# Patient Record
Sex: MALE | Race: WHITE | Employment: FULL TIME | URBAN - METROPOLITAN AREA
[De-identification: names, ages, dates, MRNs, and addresses within clinical notes are randomized per-mention and may not be internally consistent; named-entity substitution may affect disease eponyms.]

---

## 2018-08-06 ENCOUNTER — OFFICE VISIT (OUTPATIENT)
Dept: ORTHOPEDIC SURGERY | Age: 63
End: 2018-08-06

## 2018-08-06 ENCOUNTER — TELEPHONE (OUTPATIENT)
Dept: ORTHOPEDIC SURGERY | Age: 63
End: 2018-08-06

## 2018-08-06 VITALS — WEIGHT: 255 LBS | HEIGHT: 73 IN | BODY MASS INDEX: 33.8 KG/M2

## 2018-08-06 DIAGNOSIS — R52 PAIN: Primary | ICD-10-CM

## 2018-08-06 DIAGNOSIS — M17.11 PRIMARY OSTEOARTHRITIS OF RIGHT KNEE: ICD-10-CM

## 2018-08-06 DIAGNOSIS — M23.221 DERANGEMENT OF POSTERIOR HORN OF MEDIAL MENISCUS DUE TO OLD TEAR OR INJURY, RIGHT KNEE: ICD-10-CM

## 2018-08-06 DIAGNOSIS — M25.561 RIGHT KNEE PAIN, UNSPECIFIED CHRONICITY: ICD-10-CM

## 2018-08-06 PROCEDURE — 20610 DRAIN/INJ JOINT/BURSA W/O US: CPT | Performed by: ORTHOPAEDIC SURGERY

## 2018-08-06 PROCEDURE — 99203 OFFICE O/P NEW LOW 30 MIN: CPT | Performed by: ORTHOPAEDIC SURGERY

## 2018-08-06 RX ORDER — ACYCLOVIR 400 MG/1
400 TABLET ORAL DAILY
COMMUNITY

## 2018-08-06 RX ORDER — MELOXICAM 7.5 MG/1
15 TABLET ORAL DAILY
Qty: 30 TABLET | Refills: 3 | Status: SHIPPED | OUTPATIENT
Start: 2018-08-06 | End: 2018-08-22

## 2018-08-06 RX ORDER — METOPROLOL SUCCINATE 50 MG/1
50 TABLET, EXTENDED RELEASE ORAL DAILY
COMMUNITY

## 2018-08-06 NOTE — PROGRESS NOTES
Chief Complaint    Knee Pain (R KNEE PAIN - PT SLIPPED DOWN STAIRS 3 YEARS AGO - LAST 6 WKS INTOLERABLE )      History of Present Illness:  Yaz Reyez is a 70 y.o. male. 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 This is been going on for about 6 weeks. He describes pain as 8/10. He describes primarily pain, swelling and weakness. Symptoms are fairly constant. He sometimes has the problem lying down and trying to sleep and also alleviates this by sitting upright. Cyst on the meniscus removed in 1983. He also has symptoms increasing with bent knee activities such as squatting, kneeling and lunging. Steps and hills to bother his knee. The pain is anterior and medial.      Pain Assessment  Location of Pain: Knee  Location Modifiers: Right  Severity of Pain: 7  Quality of Pain: Throbbing, Sharp, Dull, Aching, Locking, Grinding, Popping, Cracking, Buckling  Duration of Pain: Persistent  Frequency of Pain: Constant  Date Pain First Started: 06/06/18  Aggravating Factors: Other (Comment) (LAYING DOWN )  Limiting Behavior: Some  Relieving Factors: Rest, Nsaids, Ice  Result of Injury: No  Work-Related Injury: No  Are there other pain locations you wish to document?: No    Medical History:  Patient's medications, allergies, past medical, surgical, social and family histories were reviewed and updated as appropriate. Review of Systems:  Pertinent items are noted in HPI  Review of systems reviewed from Patient History Form dated on August 6th 2018 and available in the patient's chart under the Media tab. Vital Signs:  Ht 6' 1\" (1.854 m)   Wt 255 lb (115.7 kg)   BMI 33.64 kg/m²     General Exam:   Constitutional: Patient is adequately groomed with no evidence of malnutrition  Mental Status: The patient is oriented to time, place and person. The patient's mood and affect are appropriate.   Lymphatic: The lymphatic examination bilaterally reveals all areas to be without enlargement or induration. Knee Examination:    Inspection:  Trace effusion right knee    Palpation:  Grade 3-4 patellofemoral crepitus with severe medial and lateral retropatellar and peripatellar tenderness. He has mild lateral tenderness. He has mild medial tenderness. Mei sign did not produce catch or click. Ligamentous stability good in all directions. Range of Motion:  0-130° right knee    Strength:  Quadriceps strength reduced to 3/5 right knee    Special Tests:  Negative Homans sign right    Skin: There are no rashes, ulcerations or lesions. Gait: Mild antalgic gait right    Reflex intact    Additional Comments:       Additional Examinations:         Contralateral Exam: Examination of the contralateral knee reveals warm skin, range of motion within normal limits, good quadriceps bulk, tone and strength, no tenderness to palpation, stable cruciate and collateral ligaments, and no joint line tenderness. Right Lower Extremity: Examination of the right lower extremity does not show any tenderness, deformity or injury. Range of motion is unremarkable. There is no gross instability. There are no rashes, ulcerations or lesions. Strength and tone are normal.    Radiology:     X-rays obtained and reviewed in office:  Views standing AP, PA, lateral and sunrise  Location right knee  Impression medial and lateral compartments are fairly well maintained was pain but the patellofemoral compartment shows advanced close to bone-on-bone primary osteoarthritis         Assessment :  MRI scan that his been done showed signal change at the medial meniscus root concern for undersurface tearing without complete discontinuity of the medial meniscus extrusion. No evidence of lateral meniscus tear. Intact ligaments.   Osteoarthritic change most pronounced in the patellofemoral joint with widespread chondral denudement    Impression:  Encounter Diagnoses   Name

## 2018-08-08 ENCOUNTER — HOSPITAL ENCOUNTER (OUTPATIENT)
Dept: PHYSICAL THERAPY | Age: 63
Setting detail: THERAPIES SERIES
Discharge: HOME | End: 2018-08-08
Payer: COMMERCIAL

## 2018-08-08 PROCEDURE — 97140 MANUAL THERAPY 1/> REGIONS: CPT

## 2018-08-08 PROCEDURE — 97110 THERAPEUTIC EXERCISES: CPT

## 2018-08-08 PROCEDURE — 97161 PT EVAL LOW COMPLEX 20 MIN: CPT

## 2018-08-08 PROCEDURE — G8979 MOBILITY GOAL STATUS: HCPCS

## 2018-08-08 PROCEDURE — G8978 MOBILITY CURRENT STATUS: HCPCS

## 2018-08-08 NOTE — PLAN OF CARE
first semi-comfortable day in awhile. Relevant Medical History:R knee cyst removal 20 years ago  Functional Disability Index:PT G-Codes  Functional Assessment Tool Used: LEFS  Score: 63%  Functional Limitation: Mobility: Walking and moving around  Mobility: Walking and Moving Around Current Status (): At least 60 percent but less than 80 percent impaired, limited or restricted  Mobility: Walking and Moving Around Goal Status (): At least 20 percent but less than 40 percent impaired, limited or restricted    Pain Scale: 7 at worst/10  Easing factors: ice, cortisone injection on Monday  Provocative factors: prolonged WB     Type: []Constant   []Intermittent  []Radiating []Localized []other:     Numbness/Tingling: none    Occupation/School: and EMT    Living Status/Prior Level of Function: Independent with ADLs and IADLs, physical job, walking    OBJECTIVE:     ROM LEFT RIGHT   HIP Flex     HIP Abd     HIP Ext     HIP IR     HIP ER     Knee ext 0 0   Knee Flex 135 110   Ankle PF     Ankle DF     Ankle In     Ankle Ev     Strength  LEFT RIGHT   HIP Flexors     HIP Abductors 4- 3+   HIP Ext     Hip ER     Knee EXT (quad) 4+ 4- painful   Knee Flex (HS) 4+ 4   Ankle DF     Ankle PF     Ankle Inv     Ankle EV          Circumference  Mid apex  7 cm prox             Reflexes/Sensation:    []Dermatomes/Myotomes intact    [x]Reflexes equal and normal bilaterally   []Other:    Joint mobility:    []Normal    [x]Hypo   []Hyper    Palpation: TTP over medial aspect of knee, increased tissue tension in ITB and HS    Functional Mobility/Transfers: independent    Posture: decreased WB on R LE, hip drop bilaterally in single leg stance    Bandages/Dressings/Incisions: none    Gait: (include devices/WB status) decreased knee flexion and ext on R, decreased stance time R    Orthopedic Special Tests: none                       [x] Patient history, allergies, meds reviewed. Medical chart reviewed.  See intake

## 2018-08-08 NOTE — FLOWSHEET NOTE
RESTRICTIONS/PRECAUTIONS: PPP protocol 0-40 degrees for knee exercises     Exercises/Interventions:     Therapeutic Ex Sets/reps Notes   Long sit HS stretch 3 x 30\" HEP   Gastroc strap stretch 3 x 30\" HEP   sidelying ITB stretch 3 x 30\" HEP   Quad set with ADD squeeze  x 10 HEP   SLR 2 x 10 HEP   SAQ 2 x 10 HEP   Clamshell 2 x 10 HEP                                                Manual Intervention     PROM 4 min    Patellar mobs 2 min    HS stretch 2 min                   NMR re-education                                                      Therapeutic Exercise and NMR EXR  [x] (90098) Provided verbal/tactile cueing for activities related to strengthening, flexibility, endurance, ROM for improvements in LE, proximal hip, and core control with self care, mobility, lifting, ambulation.  [] (74728) Provided verbal/tactile cueing for activities related to improving balance, coordination, kinesthetic sense, posture, motor skill, proprioception  to assist with LE, proximal hip, and core control in self care, mobility, lifting, ambulation and eccentric single leg control.      NMR and Therapeutic Activities:    [x] (68177 or 75496) Provided verbal/tactile cueing for activities related to improving balance, coordination, kinesthetic sense, posture, motor skill, proprioception and motor activation to allow for proper function of core, proximal hip and LE with self care and ADLs  [] (16055) Gait Re-education- Provided training and instruction to the patient for proper LE, core and proximal hip recruitment and positioning and eccentric body weight control with ambulation re-education including up and down stairs     Home Exercise Program:    [x] (02768) Reviewed/Progressed HEP activities related to strengthening, flexibility, endurance, ROM of core, proximal hip and LE for functional self-care, mobility, lifting and ambulation/stair navigation   [] (34681)Reviewed/Progressed HEP activities related to improving for proper functional mobility as indicated by patients Functional Deficits. 4. Patient will return to walking for 1 mile without increased symptoms or restriction. 5. Patient will be able to ascend/descend stairs with reciprocal pattern for return to normal ADLs. New or Updated Goals (if applicable):  [x] No change to goals established upon initial eval/last progress note:  New Goals:    Progression Towards Functional goals:   [] Patient is progressing as expected towards functional goals listed. [] Progression is slowed due to complexities listed. [] Progression has been slowed due to co-morbidities.   [x] Plan just implemented, too soon to assess goals progression  [] Other:     ASSESSMENT:    [] Improvement noted relative to goals:  [] No Improvement noted related to goals:  Summary/Patient's response to treatment: See Eval    Treatment/Activity Tolerance:  [x] Patient tolerated treatment well [] Patient limited by fatique  [] Patient limited by pain  [] Patient limited by other medical complications  [] Other:     Prognosis: [x] Good [] Fair  [] Poor    Patient Requires Follow-up: [x] Yes  [] No    PLAN: See eval  [] Continue per plan of care [] Alter current plan (see comments)  [x] Plan of care initiated [] Hold pending MD visit [] Discharge    Electronically signed by: Melba Rowe PT

## 2018-08-13 ENCOUNTER — HOSPITAL ENCOUNTER (OUTPATIENT)
Dept: PHYSICAL THERAPY | Age: 63
Setting detail: THERAPIES SERIES
Discharge: HOME | End: 2018-08-13
Payer: COMMERCIAL

## 2018-08-13 ENCOUNTER — OFFICE VISIT (OUTPATIENT)
Dept: ORTHOPEDIC SURGERY | Age: 63
End: 2018-08-13

## 2018-08-13 DIAGNOSIS — M17.11 PRIMARY OSTEOARTHRITIS OF RIGHT KNEE: Primary | Chronic | ICD-10-CM

## 2018-08-13 PROCEDURE — 99212 OFFICE O/P EST SF 10 MIN: CPT | Performed by: ORTHOPAEDIC SURGERY

## 2018-08-13 NOTE — PROGRESS NOTES
His MRI scan of the right knee reveals lateral pressure syndrome. Grade 4 arthritis and lateral facet of the patella and lateral trochlea with full-thickness chondral loss and osteochondral erosion. He has grade 3 arthritis of the medial femorotibial compartment with chondral thinning, fissuring and a subtle stress osseous edema. He does have a 1.5 cm flap tear involving half of the posterior horn and root of the medial meniscus. Focal grade 3 arthritis of the lateral femoral condyle. Review of systems from August 6, 2018 unchanged. He continues with severe medial pain and this wakes him up routinely at nighttime. We made it clear to them that the lateral side is mostly arthritis and he does have pretty advanced arthritis in the patellofemoral joint but also moderate arthritis in the other 2 compartments. Because of his continued sharp catching sensations we have recommended going ahead with arthroscopy for partial medial meniscectomy and chondroplasty or debridement as needed. We discussed in great detail the risks and benefits. We discussed the risks, benefits, and complications of arthroscopic knee surgery. The patient realizes that there are concerns with this surgery with respect to infection, deep vein thrombosis, neurological injury, delayed  rehabilitation, the possibility of arthrofibrosis of the knee, and specifically  Hoffa's fat pad fibrosis that can potentially cause difficulties. The patient realizes that there are also anesthetic concerns including cardiopulmonary issues, pulmonary issues, and even possibility of death or dystrophy. The patient voiced understanding to this as well as the normal  rehabilitation  that   is involved with weeks of physical therapy, exercise, and strengthening.  The patient also realizes that even though the surgery, from a functional perspective, typically allows the patient to return to good function at about 6 weeks, that it often takes 6 months to completely rehabilitate from this operation. The patient also realizes that if there is an arthritic component to the symptoms, then they may still have some degree of arthritis pain.       I spent 10+ minutes with the patient including 5+ minutes face to face with the patient discussing and answering questions regarding their medial meniscus tear which is unstable along with primary osteoarthritis

## 2018-08-14 ENCOUNTER — TELEPHONE (OUTPATIENT)
Dept: ORTHOPEDIC SURGERY | Age: 63
End: 2018-08-14

## 2018-08-15 ENCOUNTER — APPOINTMENT (OUTPATIENT)
Dept: PHYSICAL THERAPY | Age: 63
End: 2018-08-15
Payer: COMMERCIAL

## 2018-08-16 ENCOUNTER — TELEPHONE (OUTPATIENT)
Dept: ORTHOPEDIC SURGERY | Age: 63
End: 2018-08-16

## 2018-08-20 NOTE — PROGRESS NOTES
Unsuccessful attempt to contact pt for PAT call on number provided- LMA with return number- awaiting return call

## 2018-08-21 ENCOUNTER — TELEPHONE (OUTPATIENT)
Dept: ORTHOPEDIC SURGERY | Age: 63
End: 2018-08-21

## 2018-08-22 ENCOUNTER — APPOINTMENT (OUTPATIENT)
Dept: PHYSICAL THERAPY | Age: 63
End: 2018-08-22
Payer: COMMERCIAL

## 2018-08-23 ENCOUNTER — ANESTHESIA EVENT (OUTPATIENT)
Dept: OPERATING ROOM | Age: 63
End: 2018-08-23
Payer: COMMERCIAL

## 2018-08-24 ENCOUNTER — ANESTHESIA (OUTPATIENT)
Dept: OPERATING ROOM | Age: 63
End: 2018-08-24
Payer: COMMERCIAL

## 2018-08-24 ENCOUNTER — HOSPITAL ENCOUNTER (OUTPATIENT)
Age: 63
Setting detail: OUTPATIENT SURGERY
Discharge: HOME | End: 2018-08-24
Attending: ORTHOPAEDIC SURGERY | Admitting: ORTHOPAEDIC SURGERY
Payer: COMMERCIAL

## 2018-08-24 VITALS
TEMPERATURE: 96.8 F | OXYGEN SATURATION: 96 % | SYSTOLIC BLOOD PRESSURE: 106 MMHG | DIASTOLIC BLOOD PRESSURE: 71 MMHG | RESPIRATION RATE: 1 BRPM

## 2018-08-24 VITALS
HEART RATE: 80 BPM | HEIGHT: 73 IN | BODY MASS INDEX: 34.46 KG/M2 | RESPIRATION RATE: 15 BRPM | DIASTOLIC BLOOD PRESSURE: 78 MMHG | TEMPERATURE: 96.8 F | WEIGHT: 260 LBS | SYSTOLIC BLOOD PRESSURE: 137 MMHG | OXYGEN SATURATION: 98 %

## 2018-08-24 DIAGNOSIS — M23.261 DERANGEMENT OF OTHER LATERAL MENISCUS DUE TO OLD TEAR OR INJURY, RIGHT KNEE: Primary | Chronic | ICD-10-CM

## 2018-08-24 DIAGNOSIS — M23.221 DERANGEMENT OF POSTERIOR HORN OF MEDIAL MENISCUS DUE TO OLD TEAR OR INJURY, RIGHT KNEE: Chronic | ICD-10-CM

## 2018-08-24 DIAGNOSIS — M17.11 PRIMARY OSTEOARTHRITIS OF RIGHT KNEE: Chronic | ICD-10-CM

## 2018-08-24 LAB
GLUCOSE BLD-MCNC: 130 MG/DL (ref 70–99)
GLUCOSE BLD-MCNC: 142 MG/DL (ref 70–99)
PERFORMED ON: ABNORMAL
PERFORMED ON: ABNORMAL

## 2018-08-24 PROCEDURE — 6360000002 HC RX W HCPCS: Performed by: ORTHOPAEDIC SURGERY

## 2018-08-24 PROCEDURE — 6360000002 HC RX W HCPCS: Performed by: NURSE ANESTHETIST, CERTIFIED REGISTERED

## 2018-08-24 PROCEDURE — 2580000003 HC RX 258: Performed by: ORTHOPAEDIC SURGERY

## 2018-08-24 PROCEDURE — 6370000000 HC RX 637 (ALT 250 FOR IP)

## 2018-08-24 PROCEDURE — 6360000002 HC RX W HCPCS

## 2018-08-24 PROCEDURE — 7100000001 HC PACU RECOVERY - ADDTL 15 MIN: Performed by: ORTHOPAEDIC SURGERY

## 2018-08-24 PROCEDURE — 7100000010 HC PHASE II RECOVERY - FIRST 15 MIN: Performed by: ORTHOPAEDIC SURGERY

## 2018-08-24 PROCEDURE — 3600000014 HC SURGERY LEVEL 4 ADDTL 15MIN: Performed by: ORTHOPAEDIC SURGERY

## 2018-08-24 PROCEDURE — 3700000000 HC ANESTHESIA ATTENDED CARE: Performed by: ORTHOPAEDIC SURGERY

## 2018-08-24 PROCEDURE — 2580000003 HC RX 258: Performed by: ANESTHESIOLOGY

## 2018-08-24 PROCEDURE — 7100000011 HC PHASE II RECOVERY - ADDTL 15 MIN: Performed by: ORTHOPAEDIC SURGERY

## 2018-08-24 PROCEDURE — 7100000000 HC PACU RECOVERY - FIRST 15 MIN: Performed by: ORTHOPAEDIC SURGERY

## 2018-08-24 PROCEDURE — 3600000004 HC SURGERY LEVEL 4 BASE: Performed by: ORTHOPAEDIC SURGERY

## 2018-08-24 PROCEDURE — 2500000003 HC RX 250 WO HCPCS: Performed by: ORTHOPAEDIC SURGERY

## 2018-08-24 PROCEDURE — 3700000001 HC ADD 15 MINUTES (ANESTHESIA): Performed by: ORTHOPAEDIC SURGERY

## 2018-08-24 PROCEDURE — 2709999900 HC NON-CHARGEABLE SUPPLY: Performed by: ORTHOPAEDIC SURGERY

## 2018-08-24 PROCEDURE — 2500000003 HC RX 250 WO HCPCS: Performed by: NURSE ANESTHETIST, CERTIFIED REGISTERED

## 2018-08-24 RX ORDER — MORPHINE SULFATE 2 MG/ML
2 INJECTION, SOLUTION INTRAMUSCULAR; INTRAVENOUS EVERY 5 MIN PRN
Status: DISCONTINUED | OUTPATIENT
Start: 2018-08-24 | End: 2018-08-24 | Stop reason: HOSPADM

## 2018-08-24 RX ORDER — BUPIVACAINE HYDROCHLORIDE AND EPINEPHRINE 2.5; 5 MG/ML; UG/ML
INJECTION, SOLUTION EPIDURAL; INFILTRATION; INTRACAUDAL; PERINEURAL PRN
Status: DISCONTINUED | OUTPATIENT
Start: 2018-08-24 | End: 2018-08-24 | Stop reason: HOSPADM

## 2018-08-24 RX ORDER — APREPITANT 40 MG/1
40 CAPSULE ORAL ONCE
Status: COMPLETED | OUTPATIENT
Start: 2018-08-24 | End: 2018-08-24

## 2018-08-24 RX ORDER — MIDAZOLAM HYDROCHLORIDE 1 MG/ML
INJECTION INTRAMUSCULAR; INTRAVENOUS PRN
Status: DISCONTINUED | OUTPATIENT
Start: 2018-08-24 | End: 2018-08-24 | Stop reason: SDUPTHER

## 2018-08-24 RX ORDER — HYDRALAZINE HYDROCHLORIDE 20 MG/ML
5 INJECTION INTRAMUSCULAR; INTRAVENOUS EVERY 10 MIN PRN
Status: DISCONTINUED | OUTPATIENT
Start: 2018-08-24 | End: 2018-08-24 | Stop reason: HOSPADM

## 2018-08-24 RX ORDER — PROMETHAZINE HYDROCHLORIDE 25 MG/ML
6.25 INJECTION, SOLUTION INTRAMUSCULAR; INTRAVENOUS
Status: DISCONTINUED | OUTPATIENT
Start: 2018-08-24 | End: 2018-08-24 | Stop reason: HOSPADM

## 2018-08-24 RX ORDER — FENTANYL CITRATE 50 UG/ML
INJECTION, SOLUTION INTRAMUSCULAR; INTRAVENOUS PRN
Status: DISCONTINUED | OUTPATIENT
Start: 2018-08-24 | End: 2018-08-24 | Stop reason: SDUPTHER

## 2018-08-24 RX ORDER — LIDOCAINE HYDROCHLORIDE 20 MG/ML
INJECTION, SOLUTION INFILTRATION; PERINEURAL PRN
Status: DISCONTINUED | OUTPATIENT
Start: 2018-08-24 | End: 2018-08-24 | Stop reason: SDUPTHER

## 2018-08-24 RX ORDER — KETOROLAC TROMETHAMINE 30 MG/ML
INJECTION, SOLUTION INTRAMUSCULAR; INTRAVENOUS PRN
Status: DISCONTINUED | OUTPATIENT
Start: 2018-08-24 | End: 2018-08-24 | Stop reason: SDUPTHER

## 2018-08-24 RX ORDER — ONDANSETRON 2 MG/ML
4 INJECTION INTRAMUSCULAR; INTRAVENOUS PRN
Status: DISCONTINUED | OUTPATIENT
Start: 2018-08-24 | End: 2018-08-24 | Stop reason: HOSPADM

## 2018-08-24 RX ORDER — SODIUM CHLORIDE 0.9 % (FLUSH) 0.9 %
10 SYRINGE (ML) INJECTION EVERY 12 HOURS SCHEDULED
Status: DISCONTINUED | OUTPATIENT
Start: 2018-08-24 | End: 2018-08-24 | Stop reason: HOSPADM

## 2018-08-24 RX ORDER — DIPHENHYDRAMINE HYDROCHLORIDE 50 MG/ML
12.5 INJECTION INTRAMUSCULAR; INTRAVENOUS
Status: DISCONTINUED | OUTPATIENT
Start: 2018-08-24 | End: 2018-08-24 | Stop reason: HOSPADM

## 2018-08-24 RX ORDER — APREPITANT 40 MG/1
CAPSULE ORAL
Status: COMPLETED
Start: 2018-08-24 | End: 2018-08-24

## 2018-08-24 RX ORDER — SODIUM CHLORIDE, SODIUM LACTATE, POTASSIUM CHLORIDE, CALCIUM CHLORIDE 600; 310; 30; 20 MG/100ML; MG/100ML; MG/100ML; MG/100ML
INJECTION, SOLUTION INTRAVENOUS CONTINUOUS
Status: DISCONTINUED | OUTPATIENT
Start: 2018-08-24 | End: 2018-08-24 | Stop reason: HOSPADM

## 2018-08-24 RX ORDER — PROPOFOL 10 MG/ML
INJECTION, EMULSION INTRAVENOUS PRN
Status: DISCONTINUED | OUTPATIENT
Start: 2018-08-24 | End: 2018-08-24 | Stop reason: SDUPTHER

## 2018-08-24 RX ORDER — LABETALOL HYDROCHLORIDE 5 MG/ML
5 INJECTION, SOLUTION INTRAVENOUS EVERY 10 MIN PRN
Status: DISCONTINUED | OUTPATIENT
Start: 2018-08-24 | End: 2018-08-24 | Stop reason: HOSPADM

## 2018-08-24 RX ORDER — LABETALOL HYDROCHLORIDE 5 MG/ML
INJECTION, SOLUTION INTRAVENOUS PRN
Status: DISCONTINUED | OUTPATIENT
Start: 2018-08-24 | End: 2018-08-24 | Stop reason: SDUPTHER

## 2018-08-24 RX ORDER — SCOLOPAMINE TRANSDERMAL SYSTEM 1 MG/1
PATCH, EXTENDED RELEASE TRANSDERMAL
Status: DISCONTINUED
Start: 2018-08-24 | End: 2018-08-24 | Stop reason: HOSPADM

## 2018-08-24 RX ORDER — MEPERIDINE HYDROCHLORIDE 50 MG/ML
12.5 INJECTION INTRAMUSCULAR; INTRAVENOUS; SUBCUTANEOUS EVERY 5 MIN PRN
Status: DISCONTINUED | OUTPATIENT
Start: 2018-08-24 | End: 2018-08-24 | Stop reason: HOSPADM

## 2018-08-24 RX ORDER — DEXAMETHASONE SODIUM PHOSPHATE 4 MG/ML
INJECTION, SOLUTION INTRA-ARTICULAR; INTRALESIONAL; INTRAMUSCULAR; INTRAVENOUS; SOFT TISSUE PRN
Status: DISCONTINUED | OUTPATIENT
Start: 2018-08-24 | End: 2018-08-24 | Stop reason: SDUPTHER

## 2018-08-24 RX ORDER — IBUPROFEN 800 MG/1
800 TABLET ORAL EVERY 6 HOURS PRN
Qty: 120 TABLET | Refills: 3 | Status: SHIPPED | OUTPATIENT
Start: 2018-08-24 | End: 2018-12-06 | Stop reason: SDUPTHER

## 2018-08-24 RX ORDER — SODIUM CHLORIDE 0.9 % (FLUSH) 0.9 %
10 SYRINGE (ML) INJECTION PRN
Status: DISCONTINUED | OUTPATIENT
Start: 2018-08-24 | End: 2018-08-24 | Stop reason: HOSPADM

## 2018-08-24 RX ORDER — SCOLOPAMINE TRANSDERMAL SYSTEM 1 MG/1
1 PATCH, EXTENDED RELEASE TRANSDERMAL ONCE
Status: DISCONTINUED | OUTPATIENT
Start: 2018-08-24 | End: 2018-08-24 | Stop reason: HOSPADM

## 2018-08-24 RX ORDER — OXYCODONE HYDROCHLORIDE AND ACETAMINOPHEN 5; 325 MG/1; MG/1
1 TABLET ORAL PRN
Status: DISCONTINUED | OUTPATIENT
Start: 2018-08-24 | End: 2018-08-24 | Stop reason: HOSPADM

## 2018-08-24 RX ORDER — MORPHINE SULFATE 2 MG/ML
1 INJECTION, SOLUTION INTRAMUSCULAR; INTRAVENOUS EVERY 5 MIN PRN
Status: DISCONTINUED | OUTPATIENT
Start: 2018-08-24 | End: 2018-08-24 | Stop reason: HOSPADM

## 2018-08-24 RX ORDER — LIDOCAINE HYDROCHLORIDE 10 MG/ML
1 INJECTION, SOLUTION EPIDURAL; INFILTRATION; INTRACAUDAL; PERINEURAL
Status: DISCONTINUED | OUTPATIENT
Start: 2018-08-24 | End: 2018-08-24 | Stop reason: HOSPADM

## 2018-08-24 RX ORDER — ONDANSETRON 2 MG/ML
INJECTION INTRAMUSCULAR; INTRAVENOUS PRN
Status: DISCONTINUED | OUTPATIENT
Start: 2018-08-24 | End: 2018-08-24 | Stop reason: SDUPTHER

## 2018-08-24 RX ORDER — SODIUM CHLORIDE, SODIUM LACTATE, POTASSIUM CHLORIDE, AND CALCIUM CHLORIDE .6; .31; .03; .02 G/100ML; G/100ML; G/100ML; G/100ML
IRRIGANT IRRIGATION PRN
Status: DISCONTINUED | OUTPATIENT
Start: 2018-08-24 | End: 2018-08-24 | Stop reason: HOSPADM

## 2018-08-24 RX ORDER — OXYCODONE HYDROCHLORIDE AND ACETAMINOPHEN 5; 325 MG/1; MG/1
2 TABLET ORAL PRN
Status: DISCONTINUED | OUTPATIENT
Start: 2018-08-24 | End: 2018-08-24 | Stop reason: HOSPADM

## 2018-08-24 RX ADMIN — LABETALOL HYDROCHLORIDE 7.5 MG: 5 INJECTION, SOLUTION INTRAVENOUS at 09:14

## 2018-08-24 RX ADMIN — PROPOFOL 400 MG: 10 INJECTION, EMULSION INTRAVENOUS at 09:04

## 2018-08-24 RX ADMIN — DEXAMETHASONE SODIUM PHOSPHATE 10 MG: 4 INJECTION, SOLUTION INTRAMUSCULAR; INTRAVENOUS at 09:09

## 2018-08-24 RX ADMIN — APREPITANT 40 MG: 40 CAPSULE ORAL at 08:51

## 2018-08-24 RX ADMIN — ONDANSETRON 4 MG: 2 INJECTION INTRAMUSCULAR; INTRAVENOUS at 09:18

## 2018-08-24 RX ADMIN — SODIUM CHLORIDE, POTASSIUM CHLORIDE, SODIUM LACTATE AND CALCIUM CHLORIDE: 600; 310; 30; 20 INJECTION, SOLUTION INTRAVENOUS at 07:37

## 2018-08-24 RX ADMIN — ONDANSETRON 4 MG: 2 INJECTION INTRAMUSCULAR; INTRAVENOUS at 09:04

## 2018-08-24 RX ADMIN — Medication 2 G: at 09:06

## 2018-08-24 RX ADMIN — MIDAZOLAM HYDROCHLORIDE 2 MG: 2 INJECTION, SOLUTION INTRAMUSCULAR; INTRAVENOUS at 09:04

## 2018-08-24 RX ADMIN — DEXAMETHASONE SODIUM PHOSPHATE 10 MG: 4 INJECTION, SOLUTION INTRAMUSCULAR; INTRAVENOUS at 09:04

## 2018-08-24 RX ADMIN — LIDOCAINE HYDROCHLORIDE 40 MG: 20 INJECTION, SOLUTION INFILTRATION; PERINEURAL at 09:04

## 2018-08-24 RX ADMIN — FENTANYL CITRATE 50 MCG: 50 INJECTION INTRAMUSCULAR; INTRAVENOUS at 09:11

## 2018-08-24 RX ADMIN — KETOROLAC TROMETHAMINE 60 MG: 30 INJECTION, SOLUTION INTRAMUSCULAR; INTRAVENOUS at 09:21

## 2018-08-24 RX ADMIN — FENTANYL CITRATE 50 MCG: 50 INJECTION INTRAMUSCULAR; INTRAVENOUS at 09:04

## 2018-08-24 ASSESSMENT — PULMONARY FUNCTION TESTS
PIF_VALUE: 4
PIF_VALUE: 3
PIF_VALUE: 1
PIF_VALUE: 3
PIF_VALUE: 2
PIF_VALUE: 2
PIF_VALUE: 3
PIF_VALUE: 2
PIF_VALUE: 16
PIF_VALUE: 2
PIF_VALUE: 26
PIF_VALUE: 2
PIF_VALUE: 25
PIF_VALUE: 3
PIF_VALUE: 2
PIF_VALUE: 3
PIF_VALUE: 15
PIF_VALUE: 2
PIF_VALUE: 2

## 2018-08-24 ASSESSMENT — PAIN SCALES - GENERAL
PAINLEVEL_OUTOF10: 0

## 2018-08-24 ASSESSMENT — PAIN - FUNCTIONAL ASSESSMENT: PAIN_FUNCTIONAL_ASSESSMENT: 0-10

## 2018-08-24 NOTE — PROGRESS NOTES
Discharge instructions reviewed with patient/responsible adult and understanding verbalized. Discharge instructions signed and copies given. Patient discharged home with belongings.   Wheeled to car

## 2018-08-24 NOTE — OP NOTE
Knee Arthroscopy  Patient Name Rosalia Plaza       1955  Surgery Date 18    Preoperative Diagnosis-  Medial meniscus tear right knee with primary osteoarthritis right knee    Postoperative Diagnosis-  Same plus lateral meniscus tear    Procedure- 1. VAS right knee with partial medial and lateral meniscectomy 16636    Surgeon-  Javon Braga M.D. Assistant(s)-  SA    Anesthesia- General      Indications for Operation  Pain and catching with + MRI    Site Marking and Surgical Prep  The patient was seen in the holding area and the appropriate extremity marked with a pen. The patient was taken to the operative suite, identified, placed on the operating room table in the supine position. After induction of general anesthesia, the extremity was elevated, prepped with Duraprep and draped in the normal, sterile fashion. Examination  Under Anesthesia  stable    Diagnostic Arthroscopy  A standard inferolateral portal was established. The trocar and cannula were inserted. The trocar was removed and the arthroscope and inflow inserted. The inferomedial portal was established under direct vision after localizing the joint with a spinal needle. A nerve hook was inserted and all relevant structures probed. Systematic arthroscopy was performed and the findings are summarized below:  1. Patellofemoral Compartment- grade 4 DJD  2. Medial Compartment- posterior horn flap and undersurface posterior horn tear with grade 3 DJD femur  3. Intercondylar Notch- ACL OK  4. Lateral Compartment- flap tears anterior horn/body    Mensical/Chondral Work  5. Meniscectomy  a. Partial medial meniscectomy. Using a combination of biters and a shaver, the tear was carefully debrided to a stable rim. b. Partial lateral meniscectomy. Using a combination of biters and a shaver, the tear was carefully debrided to a stable rim. c.       Closure  The portal sites were closed with 4-0 Nylon.   Marcaine (0.5%) with epinephrine was injected into the joint. Sterile dressings and an elastic bandage were placed. The patient was awakened and taken to the postoperative area in stable condition. The toes were pink and warm. All sponge and needle counts were correct.

## 2018-08-24 NOTE — ANESTHESIA POSTPROCEDURE EVALUATION
Output:  [id number]    Nausea & Vomiting:  No    Level of Consciousness:  Awake    Pain Assessment:  Adequate analgesia    Anesthesia Complications:  No apparent anesthetic complications    SUMMARY      Vital signs stable  OK to discharge from Stage I post anesthesia care.   Care transferred from Anesthesiology department on discharge from perioperative area

## 2018-08-24 NOTE — H&P
I have reviewed the History and Physcial  And examined the patient  No revelant changes. I have reviewed with the patient and/or family the risks,benefits and alternatives to the procedure. I have reviewed the History and Physcial  And examined the patient  No revelant changes. I have reviewed with the patient and/or family the risks,benefits and alternatives to the procedure.

## 2018-08-24 NOTE — PROGRESS NOTES
Pt has crutches and knows how to use them      POCT      Blood Glucose:130        (Normal Range 70-99)    PT:      (Normal Range 9.8 - 13)    INR:      (Normal Range 0.86-1.14)

## 2018-08-27 ENCOUNTER — HOSPITAL ENCOUNTER (OUTPATIENT)
Dept: PHYSICAL THERAPY | Age: 63
Setting detail: THERAPIES SERIES
Discharge: HOME | End: 2018-08-27
Payer: COMMERCIAL

## 2018-08-27 ENCOUNTER — OFFICE VISIT (OUTPATIENT)
Dept: ORTHOPEDIC SURGERY | Age: 63
End: 2018-08-27

## 2018-08-27 ENCOUNTER — TELEPHONE (OUTPATIENT)
Dept: ORTHOPEDIC SURGERY | Age: 63
End: 2018-08-27

## 2018-08-27 DIAGNOSIS — M17.11 PRIMARY OSTEOARTHRITIS OF RIGHT KNEE: Primary | Chronic | ICD-10-CM

## 2018-08-27 PROCEDURE — 99024 POSTOP FOLLOW-UP VISIT: CPT | Performed by: ORTHOPAEDIC SURGERY

## 2018-08-27 PROCEDURE — 97161 PT EVAL LOW COMPLEX 20 MIN: CPT | Performed by: PHYSICAL THERAPIST

## 2018-08-27 PROCEDURE — G8979 MOBILITY GOAL STATUS: HCPCS | Performed by: PHYSICAL THERAPIST

## 2018-08-27 PROCEDURE — 97016 VASOPNEUMATIC DEVICE THERAPY: CPT | Performed by: PHYSICAL THERAPIST

## 2018-08-27 PROCEDURE — G8978 MOBILITY CURRENT STATUS: HCPCS | Performed by: PHYSICAL THERAPIST

## 2018-08-27 PROCEDURE — MISC915 KNEE SLEEVE OPEN OR CLOSED - BREG: Performed by: ORTHOPAEDIC SURGERY

## 2018-08-27 PROCEDURE — 97110 THERAPEUTIC EXERCISES: CPT | Performed by: PHYSICAL THERAPIST

## 2018-08-27 NOTE — PLAN OF CARE
much pain. Had cortisone injection and attempted PT x 1 session. Following MRI showing MMT patient elected for sx 8/24/18. Patient reports fair amount of pain since surgery. Some difficulty sleeping. Alternating medication between ibuprofen and Extra strength Tylenol. Taking half pain medication for low back even prior to sx. Compression sleeve following . Patient reports bone on bone behind patella. Patient started using 1 crutch immediately after sx. Increase in pain with WB. Some difficulty with don/ doff shoes. 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 Step to gait with stairs with use of handrails. Icing every 4 hours.       Relevant Medical History:R knee cyst removal 20 years ago  Functional Disability Index:PT G-Codes  Functional Assessment Tool Used: LEFS  Score: 81%  Functional Limitation: Mobility: Walking and moving around  Mobility: Walking and Moving Around Current Status (): At least 80 percent but less than 100 percent impaired, limited or restricted  Mobility: Walking and Moving Around Goal Status ():  At least 20 percent but less than 40 percent impaired, limited or restricted    Pain Scale: 4/10  Easing factors: ice, medication, activity modification  Provocative factors: sleeping, standing/ walking, any increase in weight bearing, stairs, don/doff shoes and socks      Type: [x]Constant   []Intermittent  []Radiating []Localized []other:     Numbness/Tingling: Denies    Occupation/School: and EMT    Living Status/Prior Level of Function: Independent with ADLs and IADLs, physical job of EMT    OBJECTIVE:     ROM LEFT RIGHT   Knee ext 0 deg 0 deg   Knee Flex 130 deg 97 deg   Strength  LEFT RIGHT   Knee EXT (quad) Good Fair (pain under patella)   Knee Flex (HS)          Circumference  Mid      42.5 cm mid patella 43 cm      Reflexes/Sensation: Sensation to light touch WNL   []Dermatomes/Myotomes intact    []Reflexes equal and normal bilaterally   []Other:    Joint mobility:    []Normal    [x]Hypo   []Hyper    Palpation: Patellar compression / mobility tenderness    Functional Mobility/Transfers: Caution with all transfers and bed mobility delayed    Posture: Uncomfortable with knee extension    Bandages/Dressings/Incisions: 3 scope portals - covered with steri-strips. No signs of infection or redness    Gait: (include devices/WB status) Ambulates into clinic with 1 axillary crutch present on the right side    Orthopedic Special Tests: NA                       [x] Patient history, allergies, meds reviewed. Medical chart reviewed. See intake form. Review Of Systems (ROS):  [x]Performed Review of systems (Integumentary, CardioPulmonary, Neurological) by intake and observation. Intake form has been scanned into medical record. Patient has been instructed to contact their primary care physician regarding ROS issues if not already being addressed at this time.       Co-morbidities/Complexities (which will affect course of rehabilitation):   []None           Arthritic conditions   []Rheumatoid arthritis (M05.9)  [x]Osteoarthritis (M19.91)   Cardiovascular conditions   []Hypertension (I10)  []Hyperlipidemia (E78.5)  []Angina pectoris (I20)  []Atherosclerosis (I70)   Musculoskeletal conditions   []Disc pathology   []Congenital spine pathologies   [x]Prior surgical intervention  []Osteoporosis (M81.8)  []Osteopenia (M85.8)   Endocrine conditions   []Hypothyroid (E03.9)  []Hyperthyroid Gastrointestinal conditions   []Constipation (M56.08)   Metabolic conditions   []Morbid obesity (E66.01)  [x]Diabetes type 1(E10.65) or 2 (E11.65)   []Neuropathy (G60.9)     Pulmonary conditions   []Asthma (J45)  []Coughing   []COPD (J44.9)   Psychological Disorders  []Anxiety (F41.9)  []Depression (F32.9)   []Other:   []Other:          Barriers to/and or personal factors that will affect rehab potential:              []Age  []Sex              []Motivation/Lack of to forward bend   [x]Reduced ability to ambulate prolonged functional periods/distances/surfaces   [x]Reduced ability to ascend/descend stairs   [x]Reduced ability to run, hop, cut or jump   []other:    Participation Restrictions   [x]Reduced participation in self care activities   [x]Reduced participation in home management activities   [x]Reduced participation in work activities   [x]Reduced participation in social activities. []Reduced participation in sport/recreation activities. Classification :    [x]Signs/symptoms consistent with post-surgical status including decreased ROM, strength and function.    []Signs/symptoms consistent with joint sprain/strain   []Signs/symptoms consistent with patella-femoral syndrome   [x]Signs/symptoms consistent with knee OA/hip OA   []Signs/symptoms consistent with internal derangement of knee/Hip   []Signs/symptoms consistent with functional hip weakness/NMR control      []Signs/symptoms consistent with tendinitis/tendinosis    []signs/symptoms consistent with pathology which may benefit from Dry needling      []other:      Prognosis/Rehab Potential:      []Excellent   [x]Good    []Fair   []Poor    Tolerance of evaluation/treatment:    []Excellent   [x]Good    []Fair   []Poor  Physical Therapy Evaluation Complexity Justification  [x] A history of present problem with:  [] no personal factors and/or comorbidities that impact the plan of care;  []1-2 personal factors and/or comorbidities that impact the plan of care  [x]3 personal factors and/or comorbidities that impact the plan of care  [x] An examination of body systems using standardized tests and measures addressing any of the following: body structures and functions (impairments), activity limitations, and/or participation restrictions;:  [] a total of 1-2 or more elements   [x] a total of 3 or more elements   [] a total of 4 or more elements   [x] A clinical presentation with:  [x] stable and/or uncomplicated

## 2018-08-27 NOTE — FLOWSHEET NOTE
to strengthening, flexibility, endurance, ROM of core, proximal hip and LE for functional self-care, mobility, lifting and ambulation/stair navigation   [] (62930)Reviewed/Progressed HEP activities related to improving balance, coordination, kinesthetic sense, posture, motor skill, proprioception of core, proximal hip and LE for self care, mobility, lifting, and ambulation/stair navigation      Manual Treatments:  PROM / STM / Oscillations-Mobs:  G-I, II, III, IV (PA's, Inf., Post.)  [x] (10546) Provided manual therapy to mobilize LE, proximal hip and/or LS spine soft tissue/joints for the purpose of modulating pain, promoting relaxation,  increasing ROM, reducing/eliminating soft tissue swelling/inflammation/restriction, improving soft tissue extensibility and allowing for proper ROM for normal function with self care, mobility, lifting and ambulation. Modalities:  Vaso x 15 minutes    Charges:  Timed Code Treatment Minutes: 20   Total Treatment Minutes: 50     [x] EVAL (LOW) 12275 (typically 20 minutes face-to-face)  [] EVAL (MOD) 44685 (typically 30 minutes face-to-face)  [] EVAL (HIGH) 63270 (typically 45 minutes face-to-face)  [] RE-EVAL     [x] AO(90573) x  1   [] IONTO  [] NMR (22968) x      [x] VASO  [] Manual (31296) x       [] Other:  [] TA x       [] Mech Traction (22224)  [] ES(attended) (67921)      [] ES (un) (25384):     GOALS:   Patient stated goal: Return to work and regain strength     Therapist goals for Patient:   Short Term Goals: To be achieved in: 2 weeks  1. Independent in HEP and progression per patient tolerance, in order to prevent re-injury. 2. Patient will have a decrease in pain to facilitate improvement in movement, function, and ADLs as indicated by Functional Deficits.     Long Term Goals: To be achieved in: 6 weeks  1. Disability index score of 35% or less for the LEFS to assist with reaching prior level of function.    2. Patient will demonstrate increased AROM to 0-130 deg to

## 2018-08-27 NOTE — PROGRESS NOTES
History of present illness: The patient returns today for their first postoperative visit after knee arthroscopy. Pain control has been satisfactory with oral medications. There have been no fevers or chills. Physical examination:   Inspection reveals expected swelling. Incisions are clean, dry, and intact. No signs of infection. Range of motion limited by pain and swelling. There is no calf pain or signs of DVT with a negative Homans sign. Assessment/plan:   The patient is doing well after knee arthroscopy. Surgical findings were reviewed today and pictures were discussed with the patient and they were provided a copy. I have recommended ice, judicious use of the NSAIDs with GI precautions, and physical therapy to diminish swelling and restore both range of motion and strength. We will see the patient back in 3-4 weeks. The patient verbalized good understanding of the plan. Because of his advanced patellofemoral and medial compartment osteoarthritis we've also recommended going ahead with viscous supplementation which we will apply for.

## 2018-08-31 ENCOUNTER — HOSPITAL ENCOUNTER (OUTPATIENT)
Dept: PHYSICAL THERAPY | Age: 63
Setting detail: THERAPIES SERIES
Discharge: HOME | End: 2018-08-31
Payer: COMMERCIAL

## 2018-08-31 PROCEDURE — 97016 VASOPNEUMATIC DEVICE THERAPY: CPT | Performed by: PHYSICAL THERAPIST

## 2018-08-31 PROCEDURE — 97110 THERAPEUTIC EXERCISES: CPT | Performed by: PHYSICAL THERAPIST

## 2018-08-31 PROCEDURE — 97140 MANUAL THERAPY 1/> REGIONS: CPT | Performed by: PHYSICAL THERAPIST

## 2018-08-31 NOTE — FLOWSHEET NOTE
Observation:  Palpation:     Test used Initial score Current Score   Pain Summary VAS 4/10 2/10   Functional questionnaire LEFS     ROM flexion 97 deg 131 deg    extension 0 deg 0 deg   Strength quad Fair     ABD      flexion          RESTRICTIONS/PRECAUTIONS: DM, Grade 4 DJD of knee/ patella, hx of knee scope right, left ACL repair     Exercises/Interventions:     Therapeutic Ex Sets/reps Notes   Long sitting HS stretch 4 x 20\" HEP   gastroc belt stretch 4 x 20\" HEP   SLR flexion 2 x 10 HEP   Heel slide flexion 10 x 5\"  HEP   Quad set with towel roll 10 x 10\"  HEP   SAQ 20 x     SL ABD 20 x     Prone TKE 15 x 5\"                    Mini Squats 20 x     HR/ TR 20  x    Incline stretch 4 x 20\"     Bike NV         Manual Intervention     Patellar mobs, PROM, gs/ hs stretch X 8 minutes Very tight gs/ hs                            NMR re-education          Demonstrated on correct side for one AD, discussed 2 crutches if pain increasing that significantly with 1                                           Therapeutic Exercise and NMR EXR  [x] (25133) Provided verbal/tactile cueing for activities related to strengthening, flexibility, endurance, ROM for improvements in LE, proximal hip, and core control with self care, mobility, lifting, ambulation.  [] (99667) Provided verbal/tactile cueing for activities related to improving balance, coordination, kinesthetic sense, posture, motor skill, proprioception  to assist with LE, proximal hip, and core control in self care, mobility, lifting, ambulation and eccentric single leg control.      NMR and Therapeutic Activities:    [x] (29542 or 95693) Provided verbal/tactile cueing for activities related to improving balance, coordination, kinesthetic sense, posture, motor skill, proprioception and motor activation to allow for proper function of core, proximal hip and LE with self care and ADLs  [] (34319) Gait Re-education- Provided training and instruction to the patient for proper

## 2018-09-04 ENCOUNTER — HOSPITAL ENCOUNTER (OUTPATIENT)
Dept: PHYSICAL THERAPY | Age: 63
Setting detail: THERAPIES SERIES
Discharge: HOME | End: 2018-09-04
Payer: COMMERCIAL

## 2018-09-04 PROCEDURE — 97110 THERAPEUTIC EXERCISES: CPT | Performed by: PHYSICAL THERAPIST

## 2018-09-04 PROCEDURE — 97016 VASOPNEUMATIC DEVICE THERAPY: CPT | Performed by: PHYSICAL THERAPIST

## 2018-09-04 PROCEDURE — 97140 MANUAL THERAPY 1/> REGIONS: CPT | Performed by: PHYSICAL THERAPIST

## 2018-09-04 NOTE — FLOWSHEET NOTE
[facility], [address] [place] [address] [place]  Phone: [phone]  Fax [phone]    Physical Therapy Daily Treatment Note  Date:  2018    Patient Name:  Sterling Myers    :  1955  MRN: [id number]  Restrictions/Precautions:    Physician Information:  Referring Practitioner: Dr. Angeline Varela   Medical/Treatment Diagnosis Information:  · Diagnosis: M25.561 (ICD-10-CM) - Right knee pain, unspecified chronicity ; MMT (S83.241D) ; LMT (J24.906K) s/p PMM/ PLM 18  · Treatment Diagnosis:  R knee pain M25.561,  Right knee stiffness M25.661     [] Conservative / [x] Surgical - DOS: 18  Therapy Diagnosis/Practice Pattern:  Practice Pattern I: Bony or Soft Tissue Surgery  Insurance/Certification information:  PT Insurance Information: BCBS  $15 copay   60 PT/OT no auth  Plan of care signed: [] YES  [] NO  Number of Comorbidities:  []0     []1-2    [x]3+  Date of Patient follow up with Physician:     G-Code (if applicable):      Date G-Code Applied:  18  PT G-Codes  Functional Assessment Tool Used: LEFS  Score: 81%  Functional Limitation: Mobility: Walking and moving around  Mobility: Walking and Moving Around Current Status (): At least 80 percent but less than 100 percent impaired, limited or restricted  Mobility: Walking and Moving Around Goal Status (): At least 20 percent but less than 40 percent impaired, limited or restricted    Progress Note: [x]  Yes  []  No  Next due by: Visit #10        Latex Allergy:  [x]NO      []YES  Preferred Language for Healthcare:   [x]English       []other:    Visit # Insurance Allowable Reporting Period   3 61 Begin Date: 2018               End Date:      RECERT DUE BY:     SUBJECTIVE:  Able to descend stairs with reciprocal gait with minimal to no difficulty. Feeling good. Less compliance with HEP. Pleased with progress.      OBJECTIVE: Observation:  Palpation:     Test used Initial score Current Score   Pain Summary VAS 4/10 2/10   Functional questionnaire LEFS     ROM flexion 97 deg 131 deg    extension 0 deg 0 deg   Strength quad Fair     ABD      flexion          RESTRICTIONS/PRECAUTIONS: DM, Grade 4 DJD of knee/ patella, hx of knee scope right, left ACL repair     Exercises/Interventions:     Therapeutic Ex Sets/reps Notes   Long sitting HS stretch 4 x 20\" HEP   gastroc belt stretch 4 x 20\" HEP   SLR flexion 2# 2 x 10 HEP   HEP   Quad set with towel roll 10 x 10\"  HEP   SAQ 2# 20 x     SL ABD 2# 20 x     Prone TKE 15 x 5\"     Bridges 15 x 5\"          Leg Press 140# 20 x     FSU/ over 6\" 10 x     Mini Squats 20 x     HR/ TR 20  x    Incline stretch 4 x 20\"     Bike X 8 minutes         Manual Intervention     Patellar mobs, PROM, gs/ hs stretch X 8 minutes Very tight gs/ hs   Prone quad stretch 4 x 20\"                         NMR re-education     SLS 5 x 10\"                                               Therapeutic Exercise and NMR EXR  [x] (36005) Provided verbal/tactile cueing for activities related to strengthening, flexibility, endurance, ROM for improvements in LE, proximal hip, and core control with self care, mobility, lifting, ambulation.  [] (46820) Provided verbal/tactile cueing for activities related to improving balance, coordination, kinesthetic sense, posture, motor skill, proprioception  to assist with LE, proximal hip, and core control in self care, mobility, lifting, ambulation and eccentric single leg control.      NMR and Therapeutic Activities:    [x] (43642 or 33414) Provided verbal/tactile cueing for activities related to improving balance, coordination, kinesthetic sense, posture, motor skill, proprioception and motor activation to allow for proper function of core, proximal hip and LE with self care and ADLs  [] (75703) Gait Re-education- Provided training and instruction to the patient for proper LE, core and proximal

## 2018-09-07 ENCOUNTER — APPOINTMENT (OUTPATIENT)
Dept: PHYSICAL THERAPY | Age: 63
End: 2018-09-07
Payer: COMMERCIAL

## 2018-09-07 ENCOUNTER — HOSPITAL ENCOUNTER (OUTPATIENT)
Dept: PHYSICAL THERAPY | Age: 63
Setting detail: THERAPIES SERIES
Discharge: HOME | End: 2018-09-07
Payer: COMMERCIAL

## 2018-09-07 PROCEDURE — 97016 VASOPNEUMATIC DEVICE THERAPY: CPT

## 2018-09-07 PROCEDURE — 97140 MANUAL THERAPY 1/> REGIONS: CPT

## 2018-09-07 PROCEDURE — 97110 THERAPEUTIC EXERCISES: CPT

## 2018-09-07 NOTE — FLOWSHEET NOTE
[facility], [address] [place]  Phone: [phone]  Fax [phone]    Physical Therapy Daily Treatment Note  Date:  2018    Patient Name:  Mari Monreal    :  1955  MRN: [id number]  Restrictions/Precautions:    Physician Information:  Referring Practitioner: Dr. Twila Short   Medical/Treatment Diagnosis Information:  · Diagnosis: M25.561 (ICD-10-CM) - Right knee pain, unspecified chronicity ; MMT (S83.241D) ; LMT (Q12.530I) s/p PMM/ PLM 18  · Treatment Diagnosis:  R knee pain M25.561,  Right knee stiffness M25.661     [] Conservative / [x] Surgical - DOS: 18  Therapy Diagnosis/Practice Pattern:  Practice Pattern I: Bony or Soft Tissue Surgery  Insurance/Certification information:  PT Insurance Information: [facility]  $15 copay   60 PT/OT no auth  Plan of care signed: [] YES  [] NO  Number of Comorbidities:  []0     []1-2    [x]3+  Date of Patient follow up with Physician:     G-Code (if applicable):      Date G-Code Applied:  18  PT G-Codes  Functional Assessment Tool Used: LEFS  Score: 81%  Functional Limitation: Mobility: Walking and moving around  Mobility: Walking and Moving Around Current Status (): At least 80 percent but less than 100 percent impaired, limited or restricted  Mobility: Walking and Moving Around Goal Status (): At least 20 percent but less than 40 percent impaired, limited or restricted    Progress Note: [x]  Yes  []  No  Next due by: Visit #10        Latex Allergy:  [x]NO      []YES  Preferred Language for Healthcare:   [x]English       []other:    Visit # Insurance Allowable Reporting Period   4  Begin Date: 2018               End Date:      RECERT DUE BY:     SUBJECTIVE:  Patient feeling good. States the pain he had before surgery is gone now it is just the pain from surgery and soreness he is dealing with.      OBJECTIVE: and proximal hip recruitment and positioning and eccentric body weight control with ambulation re-education including up and down stairs     Home Exercise Program:    [x] (88273) Reviewed/Progressed HEP activities related to strengthening, flexibility, endurance, ROM of core, proximal hip and LE for functional self-care, mobility, lifting and ambulation/stair navigation   [] (50768)Reviewed/Progressed HEP activities related to improving balance, coordination, kinesthetic sense, posture, motor skill, proprioception of core, proximal hip and LE for self care, mobility, lifting, and ambulation/stair navigation      Manual Treatments:  PROM / STM / Oscillations-Mobs:  G-I, II, III, IV (PA's, Inf., Post.)  [x] (94341) Provided manual therapy to mobilize LE, proximal hip and/or LS spine soft tissue/joints for the purpose of modulating pain, promoting relaxation,  increasing ROM, reducing/eliminating soft tissue swelling/inflammation/restriction, improving soft tissue extensibility and allowing for proper ROM for normal function with self care, mobility, lifting and ambulation. Modalities:  Vaso x 15 minutes    Charges:  Timed Code Treatment Minutes: 38   Total Treatment Minutes: 53     [] EVAL (LOW) 70722 (typically 20 minutes face-to-face)  [] EVAL (MOD) 11956 (typically 30 minutes face-to-face)  [] EVAL (HIGH) 54131 (typically 45 minutes face-to-face)  [] RE-EVAL     [x] UB(20688) x  2   [] IONTO  [] NMR (47434) x      [x] VASO  [x] Manual (58530) x  1    [] Other:  [] TA x       [] Mech Traction (64330)  [] ES(attended) (56540)      [] ES (un) (20049):     GOALS:   Patient stated goal: Return to work and regain strength     Therapist goals for Patient:   Short Term Goals: To be achieved in: 2 weeks  1. Independent in HEP and progression per patient tolerance, in order to prevent re-injury.    2. Patient will have a decrease in pain to facilitate improvement in movement, function, and ADLs as indicated by Functional Deficits.     Long Term Goals: To be achieved in: 6 weeks  1. Disability index score of 35% or less for the LEFS to assist with reaching prior level of function. 2. Patient will demonstrate increased AROM to 0-130 deg to allow for proper joint functioning as indicated by patients Functional Deficits. 3. Patient will demonstrate an increase in Strength to good proximal hip strength and control,5/5 MMT  In left knee LE to allow for proper functional mobility as indicated by patients Functional Deficits. 4. Patient will return to symmetrical gait and functional activities without increased symptoms or restriction. 5. 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 New or Updated Goals (if applicable):  [x] No change to goals established upon initial eval/last progress note:  New Goals:    Progression Towards Functional goals:   [] Patient is progressing as expected towards functional goals listed. [] Progression is slowed due to complexities listed. [] Progression has been slowed due to co-morbidities. [x] Plan just implemented, too soon to assess goals progression  [] Other:     ASSESSMENT:    [] Improvement noted relative to goals:  [] No Improvement noted related to goals:  Summary/Patient's response to treatment: Tolerated therapy well today without any complaints. Continue to progress as patient tolerates.      Treatment/Activity Tolerance:  [x] Patient tolerated treatment well [] Patient limited by fatique  [] Patient limited by pain  [] Patient limited by other medical complications  [] Other:     Prognosis: [x] Good [] Fair  [] Poor    Patient Requires Follow-up: [x] Yes  [] No    PLAN: See eval  [x] Continue per plan of care [] Alter current plan (see comments)  [] Plan of care initiated [] Hold pending MD visit [] Discharge    Electronically signed by: Esther Sanchez 429

## 2018-09-10 ENCOUNTER — HOSPITAL ENCOUNTER (OUTPATIENT)
Dept: PHYSICAL THERAPY | Age: 63
Setting detail: THERAPIES SERIES
Discharge: HOME | End: 2018-09-10
Payer: COMMERCIAL

## 2018-09-10 PROCEDURE — 97016 VASOPNEUMATIC DEVICE THERAPY: CPT | Performed by: PHYSICAL THERAPIST

## 2018-09-10 PROCEDURE — 97140 MANUAL THERAPY 1/> REGIONS: CPT | Performed by: PHYSICAL THERAPIST

## 2018-09-10 PROCEDURE — 97110 THERAPEUTIC EXERCISES: CPT | Performed by: PHYSICAL THERAPIST

## 2018-09-10 NOTE — FLOWSHEET NOTE
[facility], [address] [place]  Phone: [phone]  Fax [phone]    Physical Therapy Daily Treatment Note  Date:  9/10/2018    Patient Name:  Gasper Bazan  \"ARACELIS\"  :  1955  MRN: [id number]  Restrictions/Precautions:    Physician Information:  Referring Practitioner: Dr. Yana Dexter   Medical/Treatment Diagnosis Information:  · Diagnosis: M25.561 (ICD-10-CM) - Right knee pain, unspecified chronicity ; MMT (S83.241D) ; LMT (M93.711H) s/p PMM/ PLM 18  · Treatment Diagnosis:  R knee pain M25.561,  Right knee stiffness M25.661     [] Conservative / [x] Surgical - DOS: 18  Therapy Diagnosis/Practice Pattern:  Practice Pattern I: Bony or Soft Tissue Surgery  Insurance/Certification information:  PT Insurance Information: BCBS  $15 copay   60 PT/OT no auth  Plan of care signed: [] YES  [] NO  Number of Comorbidities:  []0     []1-2    [x]3+  Date of Patient follow up with Physician:     G-Code (if applicable):      Date G-Code Applied:  18  PT G-Codes  Functional Assessment Tool Used: LEFS  Score: 81%  Functional Limitation: Mobility: Walking and moving around  Mobility: Walking and Moving Around Current Status (): At least 80 percent but less than 100 percent impaired, limited or restricted  Mobility: Walking and Moving Around Goal Status (): At least 20 percent but less than 40 percent impaired, limited or restricted    Progress Note: [x]  Yes  []  No  Next due by: Visit #10        Latex Allergy:  [x]NO      []YES  Preferred Language for Healthcare:   [x]English       []other:    Visit # Insurance Allowable Reporting Period   5 59 Begin Date: 2018               End Date:      RECERT DUE BY: 5/32/15    SUBJECTIVE:  Denies any pain today. No issues with stairs. Feeling fine with increases in therapy.      OBJECTIVE:   Observation:  Palpation:     Test used Initial score Current Score of 35% or less for the LEFS to assist with reaching prior level of function. 2. Patient will demonstrate increased AROM to 0-130 deg to allow for proper joint functioning as indicated by patients Functional Deficits. 3. Patient will demonstrate an increase in Strength to good proximal hip strength and control,5/5 MMT  In left knee LE to allow for proper functional mobility as indicated by patients Functional Deficits. 4. Patient will return to symmetrical gait and functional activities without increased symptoms or restriction. 5. 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 New or Updated Goals (if applicable):  [x] No change to goals established upon initial eval/last progress note:  New Goals:    Progression Towards Functional goals:   [] Patient is progressing as expected towards functional goals listed. [] Progression is slowed due to complexities listed. [] Progression has been slowed due to co-morbidities. [x] Plan just implemented, too soon to assess goals progression  [] Other:     ASSESSMENT:    [] Improvement noted relative to goals:  [] No Improvement noted related to goals:  Summary/Patient's response to treatment: Tolerating additional weight machines well. Assess response NV.      Treatment/Activity Tolerance:  [x] Patient tolerated treatment well [] Patient limited by fatique  [] Patient limited by pain  [] Patient limited by other medical complications  [] Other:     Prognosis: [x] Good [] Fair  [] Poor    Patient Requires Follow-up: [x] Yes  [] No    PLAN: See eval  [x] Continue per plan of care [] Alter current plan (see comments)  [] Plan of care initiated [] Hold pending MD visit [] Discharge    Electronically signed by: Esther Fulton 429

## 2018-09-10 NOTE — FLOWSHEET NOTE
[facility],  [address] [place]  Phone: [phone]  Fax [phone]      ATHLETIC TRAINING [address]  Date:  9/10/2018    Patient Name:  Mackenzie Stiles    :  1955  MRN: [id number]  Restrictions/Precautions:    Medical/Treatment Diagnosis Information:  ·    ·    Physician Information:       Weeks Post-op  8 wks  12 wks 16 wks 20 wks   24 wks                            Activity Log                                                  DOS/DOI:                                                    Date:  9/10/18   ATC communication    Bike    Elliptical    Treadmill    Airdyne        Gastroc stretch    Soleus stretch    Hamstring stretch    ITB stretch    Hip Flexor stretch    Quad stretch    Adductor stretch        Weight Shifting sp                              fp                              tp    Lateral walking (with/w/o TB)        Balance: PEP/Liz board                   SLS          Star excursion load/explode          Extremity reach UE/LE        Leg Press Allen. 160# 2x10                     Ecc. 100# 2x10                     Inv. Calf Press Allen. Ecc.                        Inv.        SANDRINE   Flex               ABd 90# R/L 2x10              ADd              # 2x10              Ext 105# R/L 2x10       Steps Up               Up and Over 6\" R 20x              Down 6\" R 20x              Lateral 6\" 15x              Rotation        Squats  mini                  wall                 BOSU         Lunges:  Lunge to Balance                   Balance to Lunge                   Walking        Knee Extension Bilat. Ecc.                               Inv. Hamstring Curls Bilat. Ecc.                               Inv.        Soleus Press Bilat.                            Ecc. Inv.                            Ladders                Square               Jump/Hop  Low                      Med.                      High                                                            Modality    Initials                             EP   Time spent with PT assistant

## 2018-09-14 ENCOUNTER — HOSPITAL ENCOUNTER (OUTPATIENT)
Dept: PHYSICAL THERAPY | Age: 63
Setting detail: THERAPIES SERIES
Discharge: HOME | End: 2018-09-14
Payer: COMMERCIAL

## 2018-09-14 ENCOUNTER — HOSPITAL ENCOUNTER (OUTPATIENT)
Dept: PHYSICAL THERAPY | Age: 63
Setting detail: THERAPIES SERIES
End: 2018-09-14
Payer: COMMERCIAL

## 2018-09-14 PROCEDURE — G8979 MOBILITY GOAL STATUS: HCPCS | Performed by: PHYSICAL THERAPIST

## 2018-09-14 PROCEDURE — 97110 THERAPEUTIC EXERCISES: CPT | Performed by: PHYSICAL THERAPIST

## 2018-09-14 PROCEDURE — G8980 MOBILITY D/C STATUS: HCPCS | Performed by: PHYSICAL THERAPIST

## 2018-09-14 PROCEDURE — G8978 MOBILITY CURRENT STATUS: HCPCS | Performed by: PHYSICAL THERAPIST

## 2018-09-14 PROCEDURE — 97016 VASOPNEUMATIC DEVICE THERAPY: CPT | Performed by: PHYSICAL THERAPIST

## 2018-09-14 PROCEDURE — 97140 MANUAL THERAPY 1/> REGIONS: CPT | Performed by: PHYSICAL THERAPIST

## 2018-09-14 NOTE — FLOWSHEET NOTE
Observation:  Palpation:     Test used Initial score Current Score   Pain Summary VAS 4/10 0/10   Functional questionnaire LEFS 81% 9%   ROM flexion 97 deg 135 deg    extension 0 deg 0 deg   Strength quad Fair 5    ABD  5    flexion  5        RESTRICTIONS/PRECAUTIONS: DM, Grade 4 DJD of knee/ patella, hx of knee scope right, left ACL repair     Exercises/Interventions:     Therapeutic Ex Sets/reps Notes   Long sitting HS stretch 4 x 20\" HEP   gastroc belt stretch 4 x 20\" HEP   SLR flexion 4# 2 x 10 HEP   HEP   Quad set with towel roll  HEP   SAQ 4# 20 x     SL ABD 4# 20 x     Prone TKE 15 x 5\"     Bridges 20 x 5\"     LBW 2 laps OVL   Leg Press FSU/ over     Mini Squats 20 x     HR/ TR 20  x    Incline stretch 4 x 20\"     Bike X 8 minutes         Manual Intervention     Patellar mobs, PROM, gs/ hs stretch X 8 minutes Very tight gs/ hs   Prone quad stretch 4 x 20\"                         NMR re-education     SLS 5 x 10\" Airex                                              Therapeutic Exercise and NMR EXR  [x] (66025) Provided verbal/tactile cueing for activities related to strengthening, flexibility, endurance, ROM for improvements in LE, proximal hip, and core control with self care, mobility, lifting, ambulation.  [] (76299) Provided verbal/tactile cueing for activities related to improving balance, coordination, kinesthetic sense, posture, motor skill, proprioception  to assist with LE, proximal hip, and core control in self care, mobility, lifting, ambulation and eccentric single leg control.      NMR and Therapeutic Activities:    [x] (72174 or 70704) Provided verbal/tactile cueing for activities related to improving balance, coordination, kinesthetic sense, posture, motor skill, proprioception and motor activation to allow for proper function of core, proximal hip and LE with self care and ADLs  [] (37603) Gait Re-education- Provided training and instruction to the patient for proper LE, core and proximal hip Term Goals: To be achieved in: 6 weeks  1. Disability index score of 35% or less for the LEFS to assist with reaching prior level of function. -MET  2. Patient will demonstrate increased AROM to 0-130 deg to allow for proper joint functioning as indicated by patients Functional Deficits. - MET  3. Patient will demonstrate an increase in Strength to good proximal hip strength and control,5/5 MMT  In left knee LE to allow for proper functional mobility as indicated by patients Functional Deficits. - MET  4. Patient will return to symmetrical gait and functional activities without increased symptoms or restriction. - MET  5. 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 [] Progression is slowed due to complexities listed. [] Progression has been slowed due to co-morbidities. [x] Plan just implemented, too soon to assess goals progression  [] Other:     ASSESSMENT:    [] Improvement noted relative to goals:  [] No Improvement noted related to goals:  Summary/Patient's response to treatment: see d/c note.      Treatment/Activity Tolerance:  [x] Patient tolerated treatment well [] Patient limited by fatique  [] Patient limited by pain  [] Patient limited by other medical complications  [] Other:     Prognosis: [x] Good [] Fair  [] Poor    Patient Requires Follow-up: [] Yes  [] No    PLAN: Discharge to [facility]  [] Continue per plan of care [] Alter current plan (see comments)  [] Plan of care initiated [] Hold pending MD visit [x] Discharge    Electronically signed by: Esther Mcduffie 429

## 2018-09-14 NOTE — FLOWSHEET NOTE
[facility],  [address] [place]  Phone: [phone]  Fax [phone]      ATHLETIC TRAINING [address]  Date:  2018    Patient Name:  Radha Tang    :  1955  MRN: [id number]  Restrictions/Precautions:    Medical/Treatment Diagnosis Information:  ·   Diagnosis: M25.561 (ICD-10-CM) - Right knee pain, unspecified chronicity ; MMT (S83.241D) ; LMT (R14.603I) s/p PMM/ PLM 18  Treatment Diagnosis:  R knee pain M25.561,  Right knee stiffness M25.661       Physician Information:       Weeks Post-op  8 wks  12 wks 16 wks 20 wks   24 wks                            Activity Log                                                  DOS/DOI:                                                    Date:  9/10/18    ATC communication     Bike     Elliptical     Treadmill     Airdyne          Gastroc stretch     Soleus stretch     Hamstring stretch     ITB stretch     Hip Flexor stretch     Quad stretch     Adductor stretch          Weight Shifting sp                               fp                               tp     Lateral walking (with/w/o TB)          Balance: PEP/Liz board                    SLS           Star excursion load/explode           Extremity reach UE/LE          Leg Press Allen. 160# 2x10 160# 3x10                     Ecc. 100# 2x10 120# 3x10                     Inv. 100# 3x10        Calf Press Allen.                         Ecc.                         Inv.          SANDRINE   Flex                ABd 90# R/L 2x10 75# R/L 2x12              ADd               # 2x10 135# 30x5'              Ext 105# R/L 2x10 105# R/L 2x12        Steps Up                Up and Over 6\" R 20x               Down 6\" R 20x               Lateral 6\" 15x               Rotation          Squats  mini                   wall                  BOSU           Lunges:  Lunge to Balance                    Balance to Lunge Walking          Knee Extension Bilat. Ecc.                                Inv. Hamstring Curls Bilat. Ecc.                                Inv.          Soleus Press Bilat. Ecc.                            Inv.                                Ladders                 Square                Jump/Hop  Low                       Med.                       High                                                               Modality  CP 15'   Initials                             EP EP   Time spent with PT assistant

## 2018-09-17 ENCOUNTER — OFFICE VISIT (OUTPATIENT)
Dept: ORTHOPEDIC SURGERY | Age: 63
End: 2018-09-17

## 2018-09-17 DIAGNOSIS — M17.11 PRIMARY OSTEOARTHRITIS OF RIGHT KNEE: Chronic | ICD-10-CM

## 2018-09-17 DIAGNOSIS — M23.261 DERANGEMENT OF OTHER LATERAL MENISCUS DUE TO OLD TEAR OR INJURY, RIGHT KNEE: Primary | Chronic | ICD-10-CM

## 2018-09-17 DIAGNOSIS — M23.221 DERANGEMENT OF POSTERIOR HORN OF MEDIAL MENISCUS DUE TO OLD TEAR OR INJURY, RIGHT KNEE: Chronic | ICD-10-CM

## 2018-09-17 PROCEDURE — 99024 POSTOP FOLLOW-UP VISIT: CPT | Performed by: ORTHOPAEDIC SURGERY

## 2018-12-06 DIAGNOSIS — M23.261 DERANGEMENT OF OTHER LATERAL MENISCUS DUE TO OLD TEAR OR INJURY, RIGHT KNEE: Chronic | ICD-10-CM

## 2018-12-06 DIAGNOSIS — M23.221 DERANGEMENT OF POSTERIOR HORN OF MEDIAL MENISCUS DUE TO OLD TEAR OR INJURY, RIGHT KNEE: Chronic | ICD-10-CM

## 2018-12-06 DIAGNOSIS — M17.11 PRIMARY OSTEOARTHRITIS OF RIGHT KNEE: Chronic | ICD-10-CM

## 2018-12-06 RX ORDER — IBUPROFEN 800 MG/1
TABLET ORAL
Qty: 120 TABLET | Refills: 2 | Status: SHIPPED | OUTPATIENT
Start: 2018-12-06

## 2018-12-17 ENCOUNTER — OFFICE VISIT (OUTPATIENT)
Dept: ORTHOPEDIC SURGERY | Age: 63
End: 2018-12-17
Payer: COMMERCIAL

## 2018-12-17 VITALS — BODY MASS INDEX: 34.45 KG/M2 | WEIGHT: 259.92 LBS | HEIGHT: 73 IN

## 2018-12-17 DIAGNOSIS — M23.221 DERANGEMENT OF POSTERIOR HORN OF MEDIAL MENISCUS DUE TO OLD TEAR OR INJURY, RIGHT KNEE: Chronic | ICD-10-CM

## 2018-12-17 DIAGNOSIS — M23.261 DERANGEMENT OF OTHER LATERAL MENISCUS DUE TO OLD TEAR OR INJURY, RIGHT KNEE: Primary | Chronic | ICD-10-CM

## 2018-12-17 DIAGNOSIS — M17.11 PRIMARY OSTEOARTHRITIS OF RIGHT KNEE: Chronic | ICD-10-CM

## 2018-12-17 PROCEDURE — 99212 OFFICE O/P EST SF 10 MIN: CPT | Performed by: ORTHOPAEDIC SURGERY

## 2019-06-05 ENCOUNTER — APPOINTMENT (OUTPATIENT)
Dept: GENERAL RADIOLOGY | Age: 64
End: 2019-06-05
Payer: COMMERCIAL

## 2019-06-05 ENCOUNTER — HOSPITAL ENCOUNTER (EMERGENCY)
Age: 64
Discharge: HOME | End: 2019-06-05
Attending: EMERGENCY MEDICINE
Payer: COMMERCIAL

## 2019-06-05 VITALS
TEMPERATURE: 98.4 F | DIASTOLIC BLOOD PRESSURE: 85 MMHG | RESPIRATION RATE: 16 BRPM | OXYGEN SATURATION: 96 % | BODY MASS INDEX: 33.8 KG/M2 | WEIGHT: 255 LBS | HEIGHT: 73 IN | SYSTOLIC BLOOD PRESSURE: 142 MMHG | HEART RATE: 87 BPM

## 2019-06-05 DIAGNOSIS — R07.89 CHEST WALL PAIN: Primary | ICD-10-CM

## 2019-06-05 LAB — TROPONIN: <0.01 NG/ML

## 2019-06-05 PROCEDURE — 99285 EMERGENCY DEPT VISIT HI MDM: CPT

## 2019-06-05 PROCEDURE — 6370000000 HC RX 637 (ALT 250 FOR IP): Performed by: EMERGENCY MEDICINE

## 2019-06-05 PROCEDURE — 93005 ELECTROCARDIOGRAM TRACING: CPT | Performed by: EMERGENCY MEDICINE

## 2019-06-05 PROCEDURE — 84484 ASSAY OF TROPONIN QUANT: CPT

## 2019-06-05 PROCEDURE — 90715 TDAP VACCINE 7 YRS/> IM: CPT | Performed by: EMERGENCY MEDICINE

## 2019-06-05 PROCEDURE — 90471 IMMUNIZATION ADMIN: CPT | Performed by: EMERGENCY MEDICINE

## 2019-06-05 PROCEDURE — 71046 X-RAY EXAM CHEST 2 VIEWS: CPT

## 2019-06-05 PROCEDURE — 6360000002 HC RX W HCPCS: Performed by: EMERGENCY MEDICINE

## 2019-06-05 RX ORDER — IBUPROFEN 600 MG/1
600 TABLET ORAL ONCE
Status: COMPLETED | OUTPATIENT
Start: 2019-06-05 | End: 2019-06-05

## 2019-06-05 RX ADMIN — TETANUS TOXOID, REDUCED DIPHTHERIA TOXOID AND ACELLULAR PERTUSSIS VACCINE, ADSORBED 0.5 ML: 5; 2.5; 8; 8; 2.5 SUSPENSION INTRAMUSCULAR at 21:54

## 2019-06-05 RX ADMIN — IBUPROFEN 600 MG: 600 TABLET ORAL at 21:54

## 2019-06-05 ASSESSMENT — PAIN DESCRIPTION - ORIENTATION: ORIENTATION: MID

## 2019-06-05 ASSESSMENT — PAIN DESCRIPTION - PAIN TYPE
TYPE: ACUTE PAIN

## 2019-06-05 ASSESSMENT — HEART SCORE: ECG: 1

## 2019-06-05 ASSESSMENT — PAIN SCALES - GENERAL
PAINLEVEL_OUTOF10: 4
PAINLEVEL_OUTOF10: 3
PAINLEVEL_OUTOF10: 4
PAINLEVEL_OUTOF10: 3

## 2019-06-05 ASSESSMENT — PAIN DESCRIPTION - LOCATION
LOCATION: CHEST

## 2019-06-06 LAB
EKG ATRIAL RATE: 94 BPM
EKG DIAGNOSIS: NORMAL
EKG P AXIS: 44 DEGREES
EKG P-R INTERVAL: 204 MS
EKG Q-T INTERVAL: 344 MS
EKG QRS DURATION: 90 MS
EKG QTC CALCULATION (BAZETT): 430 MS
EKG R AXIS: 37 DEGREES
EKG T AXIS: 54 DEGREES
EKG VENTRICULAR RATE: 94 BPM

## 2019-06-06 PROCEDURE — 93010 ELECTROCARDIOGRAM REPORT: CPT | Performed by: INTERNAL MEDICINE
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
## 2019-06-06 NOTE — ED NOTES
Applied antibiotic ointment and 4x4 gauze to left shin. Wrapped with gauze wrap.   Pt tolerated well and understands care taking instructions      Bhaskar Ozuna  06/05/19 4391

## 2019-06-12 ENCOUNTER — HOSPITAL ENCOUNTER (OUTPATIENT)
Age: 64
Discharge: HOME | End: 2019-06-12
Payer: OTHER MISCELLANEOUS

## 2019-06-12 ENCOUNTER — HOSPITAL ENCOUNTER (OUTPATIENT)
Dept: GENERAL RADIOLOGY | Age: 64
Discharge: HOME | End: 2019-06-12
Payer: OTHER MISCELLANEOUS
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
## 2019-06-12 DIAGNOSIS — R07.81 RIB PAIN: ICD-10-CM

## 2019-06-12 PROCEDURE — 71100 X-RAY EXAM RIBS UNI 2 VIEWS: CPT

## 2019-10-28 RX ORDER — MELOXICAM 15 MG/1
TABLET ORAL
Qty: 90 TABLET | Refills: 2 | Status: SHIPPED | OUTPATIENT
Start: 2019-10-28 | End: 2020-07-27

## 2020-07-27 RX ORDER — MELOXICAM 15 MG/1
TABLET ORAL
Qty: 90 TABLET | Refills: 0 | Status: SHIPPED | OUTPATIENT
Start: 2020-07-27

## 2020-10-19 ENCOUNTER — OFFICE VISIT (OUTPATIENT)
Dept: ORTHOPEDIC SURGERY | Age: 65
End: 2020-10-19
Payer: MEDICARE

## 2020-10-19 VITALS — WEIGHT: 255 LBS | HEIGHT: 73 IN | BODY MASS INDEX: 33.8 KG/M2

## 2020-10-19 PROCEDURE — 99213 OFFICE O/P EST LOW 20 MIN: CPT | Performed by: ORTHOPAEDIC SURGERY

## 2020-10-19 NOTE — PROGRESS NOTES
exam    Special Tests: Stable ligament exam to varus and valgus as well as anterior and posterior drawer    Skin: There are no additional worrisome rashes, ulcerations or lesions. Circulation normal    Additional Examinations:  Left Lower Extremity: Examination of the left lower extremity does not show any tenderness, deformity or injury. Range of motion is unremarkable. There is no gross instability. There are no rashes, ulcerations or lesions. Strength and tone are normal.      Radiology:     X-rays obtained and reviewed in office:  AP, PA 45 degree weightbearing, lateral, sunrise 4 views right knee obtained 10/19/2020 demonstrate tricompartmental degenerative changes most significant in the patellofemoral compartment with complete loss of joint space and osteophyte formation consistent with osteoarthritis      Assessment:  47-year-old male with right knee osteoarthritis    Office Procedures:  Orders Placed This Encounter   Procedures    XR KNEE RIGHT (MIN 4 VIEWS)     [id number]     Standing Status:   Future     Number of Occurrences:   1     Standing Expiration Date:   4/19/2021     Order Specific Question:   Reason for exam:     Answer:   Pain    ORTHOVISC INJ PER DOSE     Right knee- Orthovisc injections     Standing Status:   Future     Standing Expiration Date:   10/19/2021       Plan:   -We will submit for approval of Visco supplementation injections once again as he has had improvement with Orthovisc injections in the past  -He may continue with [place] per his prescribing physician, ice, active modification, moderate exercise, healthy lifestyle measures  -We will see him back for his injections in the near future and if there are issues in interim he will contact the office    MD Licha Newman 56 partner of [facility])    Voice Recognition Dictation disclaimer: Please note that portions of this chart were generated using Dragon dictation software.

## 2020-10-20 ENCOUNTER — TELEPHONE (OUTPATIENT)
Dept: ORTHOPEDIC SURGERY | Age: 65
End: 2020-10-20

## 2020-10-23 ENCOUNTER — TELEPHONE (OUTPATIENT)
Dept: ORTHOPEDIC SURGERY | Age: 65
End: 2020-10-23

## 2020-10-23 NOTE — TELEPHONE ENCOUNTER
Called  Anjelica Jasmine to inform him that his Visco 3 injection are approved and to schedule his 3 appointments 1 wk apart for the injection to his right knee but was unable to leave a message because his voicemail is not setup.  [facility]

## 2020-10-26 ENCOUNTER — OFFICE VISIT (OUTPATIENT)
Dept: ORTHOPEDIC SURGERY | Age: 65
End: 2020-10-26
Payer: MEDICARE

## 2020-10-26 VITALS — HEIGHT: 73 IN | BODY MASS INDEX: 33.8 KG/M2 | WEIGHT: 255 LBS

## 2020-10-26 PROCEDURE — 20610 DRAIN/INJ JOINT/BURSA W/O US: CPT | Performed by: ORTHOPAEDIC SURGERY

## 2020-10-26 NOTE — PROGRESS NOTES
Right Knee Visco 3 Injection    The patient returns today for their first Visco 3 injection to the right knee(s) for diagnosis of osteoarthritis. The risks, benefits, and complications of the injections were again discussed in detail with the patient. The risks discussed included but are not limited to infection, skin reactions, hot swollen joints, and anaphylaxis. The patient gave verbal informed consent for the injection. The patient skin was prepped with iodine and the the right knee joint(s) was injected with 2.5 ml of Visco 3 intra-articularly under sterile conditions through the anterolateral portal position. The patient tolerated the injection reasonably well. The patient was given instructions to ice the right knee(s) and avoid strenuous activities for 24-48 hours. The patient was instructed to call the office immediately if there is increased pain, redness, warmth, fever, or chills. We will see the patient back in 1 week. Amadeo Galo MD  [address] [employer] partner of [facility])        Procedures    [place] ARTHROCENTESIS ASPIR&/INJ MAJOR JT/BURSA W/O US

## 2020-11-02 ENCOUNTER — OFFICE VISIT (OUTPATIENT)
Dept: ORTHOPEDIC SURGERY | Age: 65
End: 2020-11-02
Payer: MEDICARE

## 2020-11-02 VITALS — HEIGHT: 73 IN | WEIGHT: 255 LBS | BODY MASS INDEX: 33.8 KG/M2

## 2020-11-02 PROCEDURE — 20610 DRAIN/INJ JOINT/BURSA W/O US: CPT | Performed by: ORTHOPAEDIC SURGERY

## 2020-11-02 NOTE — PROGRESS NOTES
Right Knee Visco 3 Injection    The patient returns today for their second Visco 3 injection to the right knee(s) for diagnosis of osteoarthritis. The risks, benefits, and complications of the injections were again discussed in detail with the patient. The risks discussed included but are not limited to infection, skin reactions, hot swollen joints, and anaphylaxis. The patient gave verbal informed consent for the injection. The patient skin was prepped with iodine and the the right knee joint(s) was injected with 2.5 ml of Visco 3 intra-articularly under sterile conditions through the anterolateral portal position. The patient tolerated the injection reasonably well. The patient was given instructions to ice the right knee(s) and avoid strenuous activities for 24-48 hours. The patient was instructed to call the office immediately if there is increased pain, redness, warmth, fever, or chills. We will see the patient back in 1 week. MD Licha Newman 56 partner of [facility])        Procedures    [place] ARTHROCENTESIS ASPIR&/INJ MAJOR JT/BURSA W/O US

## 2020-11-09 ENCOUNTER — OFFICE VISIT (OUTPATIENT)
Dept: ORTHOPEDIC SURGERY | Age: 65
End: 2020-11-09
Payer: MEDICARE

## 2020-11-09 VITALS — WEIGHT: 255 LBS | HEIGHT: 73 IN | BODY MASS INDEX: 33.8 KG/M2

## 2020-11-09 PROCEDURE — 20610 DRAIN/INJ JOINT/BURSA W/O US: CPT | Performed by: ORTHOPAEDIC SURGERY

## 2020-11-09 NOTE — PROGRESS NOTES
Right Knee Visco 3 Injection    The patient returns today for their third Visco 3 injection to the right knee(s) for diagnosis of osteoarthritis. The risks, benefits, and complications of the injections were again discussed in detail with the patient. The risks discussed included but are not limited to infection, skin reactions, hot swollen joints, and anaphylaxis. The patient gave verbal informed consent for the injection. The patient skin was prepped with iodine and the the right knee joint(s) was injected with 2.5 ml of Visco 3 intra-articularly under sterile conditions through the anterolateral portal position. The patient tolerated the injection reasonably well. The patient was given instructions to ice the right knee(s) and avoid strenuous activities for 24-48 hours. The patient was instructed to call the office immediately if there is increased pain, redness, warmth, fever, or chills. We will see the patient back in 3-6  months. Amadeo Galo MD  [address] [employer] partner of [address]        Procedures    [place] ARTHROCENTESIS ASPIR&/INJ MAJOR JT/BURSA W/O US

## 2023-02-03 ENCOUNTER — HOSPITAL ENCOUNTER (OUTPATIENT)
Age: 68
Discharge: HOME | End: 2023-02-03
Payer: MEDICARE

## 2023-02-03 ENCOUNTER — HOSPITAL ENCOUNTER (OUTPATIENT)
Dept: NUCLEAR MEDICINE | Age: 68
Discharge: HOME | End: 2023-02-03
Payer: MEDICARE

## 2023-02-03 DIAGNOSIS — M25.561 RIGHT KNEE PAIN, UNSPECIFIED CHRONICITY: ICD-10-CM

## 2023-02-03 DIAGNOSIS — Z96.651 STATUS POST TOTAL RIGHT KNEE REPLACEMENT: ICD-10-CM

## 2023-02-03 LAB
BASOPHILS ABSOLUTE: 0 K/UL (ref 0–0.2)
BASOPHILS RELATIVE PERCENT: 0.7 %
C-REACTIVE PROTEIN: <3 MG/L (ref 0–5.1)
EOSINOPHILS ABSOLUTE: 0.1 K/UL (ref 0–0.6)
EOSINOPHILS RELATIVE PERCENT: 2.9 %
HCT VFR BLD CALC: 43.3 % (ref 40.5–52.5)
HEMOGLOBIN: 14 G/DL (ref 13.5–17.5)
LYMPHOCYTES ABSOLUTE: 2 K/UL (ref 1–5.1)
LYMPHOCYTES RELATIVE PERCENT: 40.4 %
MCH RBC QN AUTO: 28.5 PG (ref 26–34)
MCHC RBC AUTO-ENTMCNC: 32.3 G/DL (ref 31–36)
MCV RBC AUTO: 88.1 FL (ref 80–100)
MONOCYTES ABSOLUTE: 0.4 K/UL (ref 0–1.3)
MONOCYTES RELATIVE PERCENT: 8.6 %
NEUTROPHILS ABSOLUTE: 2.3 K/UL (ref 1.7–7.7)
NEUTROPHILS RELATIVE PERCENT: 47.4 %
PDW BLD-RTO: 15.1 % (ref 12.4–15.4)
PLATELET # BLD: 154 K/UL (ref 135–450)
PMV BLD AUTO: 9 FL (ref 5–10.5)
RBC # BLD: 4.91 M/UL (ref 4.2–5.9)
SEDIMENTATION RATE, ERYTHROCYTE: 1 MM/HR (ref 0–20)
WBC # BLD: 4.8 K/UL (ref 4–11)

## 2023-02-03 PROCEDURE — 85652 RBC SED RATE AUTOMATED: CPT

## 2023-02-03 PROCEDURE — 78315 BONE IMAGING 3 PHASE: CPT

## 2023-02-03 PROCEDURE — A9503 TC99M MEDRONATE: HCPCS | Performed by: ORTHOPAEDIC SURGERY

## 2023-02-03 PROCEDURE — 3430000000 HC RX DIAGNOSTIC RADIOPHARMACEUTICAL: Performed by: ORTHOPAEDIC SURGERY

## 2023-02-03 PROCEDURE — 36415 COLL VENOUS BLD VENIPUNCTURE: CPT

## 2023-02-03 PROCEDURE — 85025 COMPLETE CBC W/AUTO DIFF WBC: CPT

## 2023-02-03 PROCEDURE — 86140 C-REACTIVE PROTEIN: CPT

## 2023-02-03 RX ORDER — TC 99M MEDRONATE 20 MG/10ML
25.2 INJECTION, POWDER, LYOPHILIZED, FOR SOLUTION INTRAVENOUS
Status: COMPLETED | OUTPATIENT
Start: 2023-02-03 | End: 2023-02-03

## 2023-02-03 RX ADMIN — TC 99M MEDRONATE 25.2 MILLICURIE: 20 INJECTION, POWDER, LYOPHILIZED, FOR SOLUTION INTRAVENOUS at 09:45

## (undated) DEVICE — TUBING PMP L8FT LNG W/ CONN FOR AR-6400 REDEUCE

## (undated) DEVICE — SOLUTION IV 1000ML LAC RINGERS PH 6.5 INJ USP VIAFLX PLAS

## (undated) DEVICE — T-DRAPE,EXTREMITY,STERILE: Brand: MEDLINE

## (undated) DEVICE — GOWN,SIRUS,POLYRNF,SETINSLV,L,20/CS: Brand: MEDLINE

## (undated) DEVICE — COMFO-TEX ELASTIC BANDAGE LATEX FREE, 6INX5YD: Brand: COMFO-TEX™

## (undated) DEVICE — METER ACCU-CHEK INFORM BASE GLUCOSE

## (undated) DEVICE — PADDING CAST W4INXL4YD NONSTERILE COT RAYON MICROPLEATED

## (undated) DEVICE — PADDING CAST W6INXL4YD ST COT RAYON MICROPLEATED HIGHLY

## (undated) DEVICE — SUTURE ETHLN SZ 4-0 L18IN NONABSORBABLE BLK L19MM PS-2 3/8 1667H

## (undated) DEVICE — SET GRAV VENT NVENT CK VLV 3 NDL FREE PRT 10 GTT

## (undated) DEVICE — STERILE POLYISOPRENE POWDER-FREE SURGICAL GLOVES: Brand: PROTEXIS

## (undated) DEVICE — CATHETER IV 20GA L1.25IN PNK FEP SFTY STR HUB RADPQ DISP

## (undated) DEVICE — GLOVE,SURG,SENSICARE,ALOE,LF,PF,7: Brand: MEDLINE

## (undated) DEVICE — 4.5 MM INCISOR PLUS STRAIGHT                                    BLADES, POWER/EP-1, VIOLET, PACKAGED                                    6 PER BOX, STERILE

## (undated) DEVICE — GLOVE SURG SZ 8 L12IN FNGR THK94MIL STD WHT LTX FREE

## (undated) DEVICE — SET ADMIN PRIMING 7ML L30IN 7.35LB 20 GTT 2ND RLER CLMP

## (undated) DEVICE — TUBE IRRIG L8IN LNG PT W/ CONN FOR PMP SYS REDEUCE

## (undated) DEVICE — PACK PROCEDURE SURG ARTHSCP CUST

## (undated) DEVICE — CONVERTED USE 304929 SPONGE GAUZE CURITY 4X4IN 16-PLY ST

## (undated) DEVICE — SOLUTION IRRIG 5L LAC R BG

## (undated) DEVICE — ZIMMER® STERILE DISPOSABLE TOURNIQUET CUFF WITH PLC, DUAL PORT, SINGLE BLADDER, 30 IN. (76 CM)